# Patient Record
Sex: FEMALE | Race: WHITE | Employment: OTHER | URBAN - METROPOLITAN AREA
[De-identification: names, ages, dates, MRNs, and addresses within clinical notes are randomized per-mention and may not be internally consistent; named-entity substitution may affect disease eponyms.]

---

## 2021-11-27 ENCOUNTER — APPOINTMENT (OUTPATIENT)
Dept: CT IMAGING | Age: 41
End: 2021-11-27
Payer: MEDICARE

## 2021-11-27 ENCOUNTER — HOSPITAL ENCOUNTER (EMERGENCY)
Age: 41
Discharge: HOME | End: 2021-11-27
Payer: MEDICARE

## 2021-11-27 VITALS
HEIGHT: 64 IN | WEIGHT: 130 LBS | DIASTOLIC BLOOD PRESSURE: 57 MMHG | TEMPERATURE: 97.9 F | OXYGEN SATURATION: 100 % | HEART RATE: 61 BPM | RESPIRATION RATE: 16 BRPM | BODY MASS INDEX: 22.2 KG/M2 | SYSTOLIC BLOOD PRESSURE: 125 MMHG

## 2021-11-27 DIAGNOSIS — S39.011A STRAIN OF ABDOMINAL WALL, INITIAL ENCOUNTER: Primary | ICD-10-CM

## 2021-11-27 LAB
A/G RATIO: 1.5 (ref 1.1–2.2)
ALBUMIN SERPL-MCNC: 4.3 G/DL (ref 3.4–5)
ALP BLD-CCNC: 46 U/L (ref 40–129)
ALT SERPL-CCNC: 10 U/L (ref 10–40)
ANION GAP SERPL CALCULATED.3IONS-SCNC: 9 MMOL/L (ref 3–16)
AST SERPL-CCNC: 12 U/L (ref 15–37)
BACTERIA: ABNORMAL /HPF
BASOPHILS ABSOLUTE: 0 K/UL (ref 0–0.2)
BASOPHILS RELATIVE PERCENT: 0.3 %
BILIRUB SERPL-MCNC: 0.3 MG/DL (ref 0–1)
BILIRUBIN URINE: NEGATIVE
BLOOD, URINE: ABNORMAL
BUN BLDV-MCNC: 16 MG/DL (ref 7–20)
CALCIUM SERPL-MCNC: 9.4 MG/DL (ref 8.3–10.6)
CHLORIDE BLD-SCNC: 101 MMOL/L (ref 99–110)
CLARITY: CLEAR
CO2: 28 MMOL/L (ref 21–32)
COLOR: YELLOW
CREAT SERPL-MCNC: 0.7 MG/DL (ref 0.6–1.1)
EOSINOPHILS ABSOLUTE: 0 K/UL (ref 0–0.6)
EOSINOPHILS RELATIVE PERCENT: 0.4 %
EPITHELIAL CELLS, UA: ABNORMAL /HPF (ref 0–5)
GFR AFRICAN AMERICAN: >60
GFR NON-AFRICAN AMERICAN: >60
GLUCOSE BLD-MCNC: 91 MG/DL (ref 70–99)
GLUCOSE URINE: NEGATIVE MG/DL
HCG QUALITATIVE: NEGATIVE
HCT VFR BLD CALC: 39.1 % (ref 36–48)
HEMOGLOBIN: 13.1 G/DL (ref 12–16)
KETONES, URINE: ABNORMAL MG/DL
LEUKOCYTE ESTERASE, URINE: NEGATIVE
LIPASE: 29 U/L (ref 13–60)
LYMPHOCYTES ABSOLUTE: 1.5 K/UL (ref 1–5.1)
LYMPHOCYTES RELATIVE PERCENT: 13.3 %
MCH RBC QN AUTO: 31 PG (ref 26–34)
MCHC RBC AUTO-ENTMCNC: 33.4 G/DL (ref 31–36)
MCV RBC AUTO: 93 FL (ref 80–100)
MICROSCOPIC EXAMINATION: YES
MONOCYTES ABSOLUTE: 0.7 K/UL (ref 0–1.3)
MONOCYTES RELATIVE PERCENT: 6.2 %
NEUTROPHILS ABSOLUTE: 9 K/UL (ref 1.7–7.7)
NEUTROPHILS RELATIVE PERCENT: 79.8 %
NITRITE, URINE: NEGATIVE
PDW BLD-RTO: 12.6 % (ref 12.4–15.4)
PH UA: 6 (ref 5–8)
PLATELET # BLD: 156 K/UL (ref 135–450)
PMV BLD AUTO: 9 FL (ref 5–10.5)
POTASSIUM REFLEX MAGNESIUM: 4.4 MMOL/L (ref 3.5–5.1)
PROTEIN UA: NEGATIVE MG/DL
RBC # BLD: 4.21 M/UL (ref 4–5.2)
RBC UA: ABNORMAL /HPF (ref 0–4)
SODIUM BLD-SCNC: 138 MMOL/L (ref 136–145)
SPECIFIC GRAVITY UA: 1.02 (ref 1–1.03)
TOTAL PROTEIN: 7.2 G/DL (ref 6.4–8.2)
URINE REFLEX TO CULTURE: ABNORMAL
URINE TYPE: ABNORMAL
UROBILINOGEN, URINE: 0.2 E.U./DL
WBC # BLD: 11.3 K/UL (ref 4–11)
WBC UA: ABNORMAL /HPF (ref 0–5)
YEAST: PRESENT /HPF

## 2021-11-27 PROCEDURE — 6360000004 HC RX CONTRAST MEDICATION: Performed by: PHYSICIAN ASSISTANT

## 2021-11-27 PROCEDURE — 85025 COMPLETE CBC W/AUTO DIFF WBC: CPT

## 2021-11-27 PROCEDURE — 96374 THER/PROPH/DIAG INJ IV PUSH: CPT

## 2021-11-27 PROCEDURE — 6360000002 HC RX W HCPCS: Performed by: PHYSICIAN ASSISTANT

## 2021-11-27 PROCEDURE — 36415 COLL VENOUS BLD VENIPUNCTURE: CPT

## 2021-11-27 PROCEDURE — 99284 EMERGENCY DEPT VISIT MOD MDM: CPT

## 2021-11-27 PROCEDURE — 84703 CHORIONIC GONADOTROPIN ASSAY: CPT

## 2021-11-27 PROCEDURE — 80053 COMPREHEN METABOLIC PANEL: CPT

## 2021-11-27 PROCEDURE — 81001 URINALYSIS AUTO W/SCOPE: CPT

## 2021-11-27 PROCEDURE — 74177 CT ABD & PELVIS W/CONTRAST: CPT

## 2021-11-27 PROCEDURE — 83690 ASSAY OF LIPASE: CPT

## 2021-11-27 RX ORDER — KETOROLAC TROMETHAMINE 30 MG/ML
15 INJECTION, SOLUTION INTRAMUSCULAR; INTRAVENOUS ONCE
Status: COMPLETED | OUTPATIENT
Start: 2021-11-27 | End: 2021-11-27

## 2021-11-27 RX ORDER — METAXALONE 800 MG/1
800 TABLET ORAL 3 TIMES DAILY
Qty: 15 TABLET | Refills: 0 | Status: SHIPPED | OUTPATIENT
Start: 2021-11-27 | End: 2021-12-02

## 2021-11-27 RX ORDER — IBUPROFEN 600 MG/1
600 TABLET ORAL
Qty: 30 TABLET | Refills: 0 | Status: SHIPPED | OUTPATIENT
Start: 2021-11-27

## 2021-11-27 RX ADMIN — IOPAMIDOL 75 ML: 755 INJECTION, SOLUTION INTRAVENOUS at 18:13

## 2021-11-27 RX ADMIN — KETOROLAC TROMETHAMINE 15 MG: 30 INJECTION, SOLUTION INTRAMUSCULAR; INTRAVENOUS at 16:46

## 2021-11-27 ASSESSMENT — PAIN DESCRIPTION - LOCATION: LOCATION: ABDOMEN

## 2021-11-27 ASSESSMENT — PAIN DESCRIPTION - PAIN TYPE: TYPE: ACUTE PAIN

## 2021-11-27 ASSESSMENT — PAIN SCALES - GENERAL
PAINLEVEL_OUTOF10: 8
PAINLEVEL_OUTOF10: 5

## 2021-11-27 NOTE — ED PROVIDER NOTES
[address] [facility]  ED  EMERGENCY DEPARTMENT ENCOUNTER        Pt Name: Chery Mendosa  MRN: [id number]  [place] 1980  Date of evaluation: 11/27/2021  Provider: Sam Lyman PA-C  PCP: Tarun Serna MD  Note Started: 4:10 PM EST       AKHIL. I have evaluated this patient. My supervising physician was available for consultation. Lorayne Kussmaul, MD      CHIEF COMPLAINT       Chief Complaint   Patient presents with    Abdominal Pain     seen at urgent care for same and directed to ED for further evaluation. symptoms since Friday morning. denies n/v/d       HISTORY OF PRESENT ILLNESS   (Location, Timing/Onset, Context/Setting, Quality, Duration, Modifying Factors, Severity, Associated Signs and Symptoms)  Note limiting factors. Chief Complaint: Right side abdominal pain    Chery Mendosa is a 42 y.o. female who presents at the request of urgent care. Patient states Wednesday morning taking a shower when she leaned forward and had a dry heave or 2. She felt sudden pop and pain in the right side abdomen flank/upper quadrant. This symptom has continued. I did ask why she was dry heaving she states when she gets stressed and there is some family issues at home this is what happens. She indicates no emesis. No diarrhea. No fevers or chills. No chest pain or shortness of breath. No nausea, vomiting, diarrhea or urinary symptoms. The pain is worse with movement, deep breath, cough or sneeze. Urgent care referred for further evaluation. Nursing Notes were all reviewed and agreed with or any disagreements were addressed in the HPI. REVIEW OF SYSTEMS    (2-9 systems for level 4, 10 or more for level 5)     Review of Systems    Positives and Pertinent negatives as per HPI. Except as noted above in the ROS, all other systems were reviewed and negative.        PAST MEDICAL HISTORY     Past Medical History:   Diagnosis Date    Cholestasis of pregnancy 2004    Postpartum depression     no medication         SURGICAL HISTORY     Past Surgical History:   Procedure Laterality Date    ABDOMEN SURGERY Right     hernia repair right groin age 15     SECTION, LOW TRANSVERSE  2007    INGUINAL HERNIA REPAIR           CURRENTMEDICATIONS       Previous Medications    PRENATAL VITAMIN (PRENATAL-S) 27-0.8 MG TABS    Take 1 tablet by mouth daily. ALLERGIES     Patient has no known allergies. FAMILYHISTORY       Family History   Problem Relation Age of Onset    Mental Illness Father     Depression Father     Cancer Maternal Grandmother     Vision Loss Mother     Heart Disease Maternal Grandfather           SOCIAL HISTORY       Social History     Tobacco Use    Smoking status: Never Smoker    Smokeless tobacco: Never Used   Substance Use Topics    Alcohol use: No    Drug use: Not on file       SCREENINGS             PHYSICAL EXAM    (up to 7 for level 4, 8 or more for level 5)     ED Triage Vitals [21 1536]   BP Temp Temp Source Pulse Resp SpO2 Height Weight   98/70 97.6 °F (36.4 °C) Oral 71 16 100 % 5' 4\" (1.626 m) 130 lb (59 kg)       Physical Exam  Vitals and nursing note reviewed. Constitutional:       Appearance: Normal appearance. She is well-developed and normal weight. HENT:      Head: Normocephalic and atraumatic. Right Ear: External ear normal.      Left Ear: External ear normal.   Eyes:      General: No scleral icterus. Right eye: No discharge. Left eye: No discharge. Conjunctiva/sclera: Conjunctivae normal.   Cardiovascular:      Rate and Rhythm: Normal rate and regular rhythm. Heart sounds: Normal heart sounds. Pulmonary:      Effort: Pulmonary effort is normal.      Breath sounds: Normal breath sounds. Comments: I heard no crackles on my exam.  Chest:      Chest wall: Tenderness present. Abdominal:      General: Abdomen is flat. Bowel sounds are normal.      Palpations: Abdomen is soft.       Tenderness: There is abdominal tenderness. Comments: My examination patient sitting in chair. She has tenderness over the lower lateral and less over the anterior posterior right chest wall. She has no specific pain right upper quadrant but as I approach the lower costal margin she does exhibit discomfort. I find no obvious hernia or muscle hematoma. Musculoskeletal:         General: Normal range of motion. Cervical back: Normal range of motion and neck supple. Skin:     General: Skin is warm and dry. Neurological:      General: No focal deficit present. Mental Status: She is alert and oriented to person, place, and time. Mental status is at baseline. Psychiatric:         Mood and Affect: Mood normal.         Behavior: Behavior normal.         Thought Content:  Thought content normal.         Judgment: Judgment normal.         DIAGNOSTIC RESULTS   LABS:    Labs Reviewed   CBC WITH AUTO DIFFERENTIAL - Abnormal; Notable for the following components:       Result Value    WBC 11.3 (*)     Neutrophils Absolute 9.0 (*)     All other components within normal limits    Narrative:     Performed at:  [place] [address] [employer]   Phone [phone]   COMPREHENSIVE METABOLIC PANEL W/ REFLEX TO MG FOR LOW K - Abnormal; Notable for the following components:    AST 12 (*)     All other components within normal limits    Narrative:     Performed at:  [facility]) [place] [address] [employer]   Phone [phone]   URINE RT REFLEX TO CULTURE - Abnormal; Notable for the following components:    Ketones, Urine TRACE (*)     Blood, Urine SMALL (*)     All other components within normal limits    Narrative:     Performed at:  [place] [address] [employer]   Phone [phone]   MICROSCOPIC URINALYSIS - Abnormal; Notable for the following components:    Epithelial Cells, UA 6-10 (*)     Bacteria, UA 2+ (*)     Yeast, UA Present (*)     All other components within normal limits    Narrative:     Performed at:  [address], [facility]   Phone [phone]   LIPASE    Narrative:     Performed at:  [facility]) [address], [facility]   Phone [phone]   HCG, SERUM, QUALITATIVE    Narrative:     Performed at:  [facility]) [address], [facility]   Phone [phone]       When ordered only abnormal lab results are displayed. All other labs were within normal range or not returned as of this dictation. EKG: When ordered, EKG's are interpreted by the Emergency Department Physician in the absence of a cardiologist.  Please see their note for interpretation of EKG. RADIOLOGY:   Non-plain film images such as CT, Ultrasound and MRI are read by the radiologist. Plain radiographic images are visualized and preliminarily interpreted by the ED Provider with the below findings:        Interpretation per the Radiologist below, if available at the time of this note:    CT ABDOMEN PELVIS W IV CONTRAST Additional Contrast? None   Final Result   No acute findings within the abdomen or pelvis. No evidence of appendicitis. No results found.         PROCEDURES   Unless otherwise noted below, none     Procedures    CRITICAL CARE TIME   N/A    CONSULTS:  None      EMERGENCY DEPARTMENT COURSE and DIFFERENTIAL DIAGNOSIS/MDM:   Vitals:    Vitals:    11/27/21 1536 11/27/21 1845   BP: 98/70 (!) 125/57   Pulse: 71 61   Resp: 16 16   Temp: 97.6 °F (36.4 °C) 97.9 °F (36.6 °C)   TempSrc: Oral Oral   SpO2: 100% 100%   Weight: 130 lb (59 kg)    Height: 5' 4\" (1.626 m)        Patient was given the following medications:  Medications   ketorolac (TORADOL) injection 15 mg (15 mg IntraVENous Given 11/27/21 1646)   iopamidol (ISOVUE-370) 76 % injection 75 mL (75 mLs IntraVENous Given 11/27/21 1813)           Patient has what appears to be an abdominal wall muscle strain. Abdominal pelvic CT scan showing no osseous abdominal abnormality. No evidence of abdominal wall hernia tenderness on exam.  Toradol 15 mg given. We will send patient home with ibuprofen and Skelaxin. Lidocaine patch OTC recommended. Heat recommended. Activity as tolerated. Follow with healthcare provider this week as needed. Return to the facility if symptoms worsen. The patient did express understanding the diagnosis and treatment plan. FINAL IMPRESSION      1. Strain of abdominal wall, initial encounter          DISPOSITION/PLAN   DISPOSITION Decision To Discharge 11/27/2021 07:44:34 PM      PATIENT REFERRED TO:  Your healthcare provider    Schedule an appointment as soon as possible for a visit in 3 days  As needed    [facility]  ED  [address]  Go to   If symptoms worsen      DISCHARGE MEDICATIONS:  New Prescriptions    IBUPROFEN (ADVIL;MOTRIN) 600 MG TABLET    Take 1 tablet by mouth 3 times daily (with meals)    METAXALONE (SKELAXIN) 800 MG TABLET    Take 1 tablet by mouth 3 times daily for 5 days       DISCONTINUED MEDICATIONS:  Discontinued Medications    No medications on file              (Please note that portions of this note were completed with a voice recognition program.  Efforts were made to edit the dictations but occasionally words are mis-transcribed. )    David Lopez PA-C (electronically signed)           David Lopez PA-C  11/27/21 1948

## 2021-11-27 NOTE — ED NOTES
Bed: 28  Expected date:   Expected time:   Means of arrival:   Comments:  [id number] Katie Rouse RN  11/27/21 7642

## 2023-09-11 ENCOUNTER — OFFICE VISIT (OUTPATIENT)
Dept: OBGYN CLINIC | Age: 43
End: 2023-09-11
Payer: MEDICARE

## 2023-09-11 VITALS
WEIGHT: 121.2 LBS | TEMPERATURE: 98.1 F | BODY MASS INDEX: 20.69 KG/M2 | HEIGHT: 64 IN | SYSTOLIC BLOOD PRESSURE: 100 MMHG | HEART RATE: 60 BPM | DIASTOLIC BLOOD PRESSURE: 68 MMHG

## 2023-09-11 DIAGNOSIS — Z86.19 HX OF HUMAN PAPILLOMAVIRUS INFECTION: ICD-10-CM

## 2023-09-11 DIAGNOSIS — Z01.419 WOMEN'S ANNUAL ROUTINE GYNECOLOGICAL EXAMINATION: Primary | ICD-10-CM

## 2023-09-11 DIAGNOSIS — Z12.31 BREAST CANCER SCREENING BY MAMMOGRAM: ICD-10-CM

## 2023-09-11 DIAGNOSIS — Z12.39 SCREENING BREAST EXAMINATION: ICD-10-CM

## 2023-09-11 DIAGNOSIS — Z98.890 HISTORY OF LOOP ELECTRICAL EXCISION PROCEDURE (LEEP): ICD-10-CM

## 2023-09-11 DIAGNOSIS — N90.89 IRRITATION OF VULVA: ICD-10-CM

## 2023-09-11 DIAGNOSIS — Z90.710 HISTORY OF ROBOT-ASSISTED LAPAROSCOPIC HYSTERECTOMY: ICD-10-CM

## 2023-09-11 DIAGNOSIS — Z87.42 HX OF ABNORMAL CERVICAL PAP SMEAR: ICD-10-CM

## 2023-09-11 PROCEDURE — 99396 PREV VISIT EST AGE 40-64: CPT | Performed by: OBSTETRICS & GYNECOLOGY

## 2023-09-11 PROCEDURE — 99999 PR OFFICE/OUTPT VISIT,PROCEDURE ONLY: CPT | Performed by: OBSTETRICS & GYNECOLOGY

## 2023-09-11 RX ORDER — CLOBETASOL PROPIONATE 0.5 MG/G
OINTMENT TOPICAL
Qty: 30 G | Refills: 1 | Status: SHIPPED | OUTPATIENT
Start: 2023-09-11

## 2023-09-11 SDOH — ECONOMIC STABILITY: INCOME INSECURITY: HOW HARD IS IT FOR YOU TO PAY FOR THE VERY BASICS LIKE FOOD, HOUSING, MEDICAL CARE, AND HEATING?: SOMEWHAT HARD

## 2023-09-11 SDOH — ECONOMIC STABILITY: HOUSING INSECURITY
IN THE LAST 12 MONTHS, WAS THERE A TIME WHEN YOU DID NOT HAVE A STEADY PLACE TO SLEEP OR SLEPT IN A SHELTER (INCLUDING NOW)?: NO

## 2023-09-11 SDOH — ECONOMIC STABILITY: FOOD INSECURITY: WITHIN THE PAST 12 MONTHS, YOU WORRIED THAT YOUR FOOD WOULD RUN OUT BEFORE YOU GOT MONEY TO BUY MORE.: SOMETIMES TRUE

## 2023-09-11 SDOH — ECONOMIC STABILITY: FOOD INSECURITY: WITHIN THE PAST 12 MONTHS, THE FOOD YOU BOUGHT JUST DIDN'T LAST AND YOU DIDN'T HAVE MONEY TO GET MORE.: NEVER TRUE

## 2023-09-11 ASSESSMENT — PATIENT HEALTH QUESTIONNAIRE - PHQ9
SUM OF ALL RESPONSES TO PHQ QUESTIONS 1-9: 0
1. LITTLE INTEREST OR PLEASURE IN DOING THINGS: 0
2. FEELING DOWN, DEPRESSED OR HOPELESS: 0
SUM OF ALL RESPONSES TO PHQ9 QUESTIONS 1 & 2: 0

## 2023-09-11 NOTE — PROGRESS NOTES
daily multivitamin use. Reviewed recommendations on Calcium and Vitamin D intake. Discussed seatbelt use. Follow Up  - Will call patient with results   -Return in about 3 months (around 12/11/2023) for Follow Up.      Cailin Alaniz,

## 2023-09-12 LAB
CANDIDA DNA VAG QL NAA+PROBE: ABNORMAL
G VAGINALIS DNA SPEC QL NAA+PROBE: ABNORMAL
T VAGINALIS DNA VAG QL NAA+PROBE: ABNORMAL

## 2024-02-15 ENCOUNTER — PATIENT MESSAGE (OUTPATIENT)
Dept: OBGYN CLINIC | Age: 44
End: 2024-02-15

## 2024-03-07 ENCOUNTER — HOSPITAL ENCOUNTER (OUTPATIENT)
Dept: WOMENS IMAGING | Age: 44
Discharge: HOME | End: 2024-03-07
Attending: OBSTETRICS & GYNECOLOGY
Payer: COMMERCIAL

## 2024-03-07 ENCOUNTER — TELEPHONE (OUTPATIENT)
Dept: OBGYN CLINIC | Age: 44
End: 2024-03-07

## 2024-03-07 VITALS — WEIGHT: 125 LBS | BODY MASS INDEX: 21.34 KG/M2 | HEIGHT: 64 IN

## 2024-03-07 DIAGNOSIS — N64.4 BREAST PAIN: ICD-10-CM

## 2024-03-07 DIAGNOSIS — N63.0 LUMP IN FEMALE BREAST: ICD-10-CM

## 2024-03-07 DIAGNOSIS — R92.8 ABNORMAL MAMMOGRAM: ICD-10-CM

## 2024-03-07 DIAGNOSIS — Z12.31 BREAST CANCER SCREENING BY MAMMOGRAM: ICD-10-CM

## 2024-03-07 DIAGNOSIS — N63.0 LUMP IN FEMALE BREAST: Primary | ICD-10-CM

## 2024-03-07 PROCEDURE — 76642 ULTRASOUND BREAST LIMITED: CPT

## 2024-03-07 PROCEDURE — G0279 TOMOSYNTHESIS, MAMMO: HCPCS

## 2024-03-07 NOTE — TELEPHONE ENCOUNTER
Pended Orders per request from [facility]. ( Pt presented with c/o breast pain and lump) please sign

## 2025-07-17 ENCOUNTER — HOSPITAL ENCOUNTER (OUTPATIENT)
Dept: WOMENS IMAGING | Age: 45
Discharge: HOME | End: 2025-07-17
Payer: COMMERCIAL

## 2025-07-17 VITALS — WEIGHT: 130 LBS | BODY MASS INDEX: 22.2 KG/M2 | HEIGHT: 64 IN

## 2025-07-17 DIAGNOSIS — Z12.31 ENCOUNTER FOR SCREENING MAMMOGRAM FOR BREAST CANCER: ICD-10-CM

## 2025-07-17 PROCEDURE — 77067 SCR MAMMO BI INCL CAD: CPT

## 2025-07-21 ENCOUNTER — TELEPHONE (OUTPATIENT)
Dept: WOMENS IMAGING | Age: 45
End: 2025-07-21

## 2025-07-25 ENCOUNTER — HOSPITAL ENCOUNTER (OUTPATIENT)
Dept: WOMENS IMAGING | Age: 45
Discharge: HOME | End: 2025-07-25
Payer: COMMERCIAL

## 2025-07-25 DIAGNOSIS — R92.8 ABNORMAL MAMMOGRAM: ICD-10-CM

## 2025-07-25 PROCEDURE — G0279 TOMOSYNTHESIS, MAMMO: HCPCS

## 2025-07-25 PROCEDURE — 76642 ULTRASOUND BREAST LIMITED: CPT
